# Patient Record
Sex: FEMALE | Race: WHITE | Employment: FULL TIME | URBAN - METROPOLITAN AREA
[De-identification: names, ages, dates, MRNs, and addresses within clinical notes are randomized per-mention and may not be internally consistent; named-entity substitution may affect disease eponyms.]

---

## 2020-01-07 ENCOUNTER — HOSPITAL ENCOUNTER (OUTPATIENT)
Dept: PHYSICAL THERAPY | Age: 45
Discharge: HOME | End: 2020-01-07
Payer: COMMERCIAL

## 2020-01-07 PROCEDURE — 97161 PT EVAL LOW COMPLEX 20 MIN: CPT

## 2020-01-07 PROCEDURE — 97140 MANUAL THERAPY 1/> REGIONS: CPT

## 2020-01-07 NOTE — PROGRESS NOTES
PHYSICAL THERAPY - DAILY TREATMENT NOTE    Patient Name: Margarita Burt        Date: 2020  : 1975   YES Patient  Verified  Visit #:      12  Insurance: Payor: Jan Adhikari / Plan: Blaise Padilla HMO / Product Type: HMO /      In time: 6:00 Out time: 6:40   Total Treatment Time: 40     Medicare Time Tracking (below)   Total Timed Codes (min):  na 1:1 Treatment Time:  na     TREATMENT AREA =  Low back pain [M54.5]    SUBJECTIVE  Pain Level (on 0 to 10 scale):    Medication Changes/New allergies or changes in medical history, any new surgeries or procedures? 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, R IS post inn, L4 L FRS, L on L SI lyn   Rationale:      decrease pain, increase ROM and increase tissue extensibility to improve patient's ability to perform ADLs    5 min Self Care: bridges   Rationale:    increase ROM, increase strength and improve coordination to improve the patients ability to perform ADLs      Billed With/As:   [] TE   [] TA   [] Neuro   [x] Self Care Patient Education: [x] Review HEP    [] Progressed/Changed HEP based on:   [] positioning   [] body mechanics   [] transfers   [] heat/ice application    [] other:       min Patient Education:  YES  Reviewed HEP   []  Progressed/Changed HEP based on:         Other Objective/Functional Measures:    SEE IE     Post Treatment Pain Level (on 0 to 10) scale:       ASSESSMENT  Assessment/Changes in Function:     SEE IE     []  See Progress Note/Recertification   Patient will continue to benefit from skilled PT services to modify and progress therapeutic interventions, address functional mobility deficits, address ROM deficits, address strength deficits, analyze and address soft tissue restrictions, analyze and cue movement patterns, analyze and modify body mechanics/ergonomics and assess and modify postural abnormalities to attain remaining goals.    Progress toward goals / Updated goals:         PLAN  []  Upgrade activities as tolerated YES Continue plan of care   []  Discharge due to :    []  Other:      Therapist: Yuval Cisneros, PT, OCS, SCS, CSCS    Date: 1/7/2020 Time: 6:59 PM       Future Appointments   Date Time Provider Dangelo Bhardwaj   1/23/2020  6:30 AM Jun Gallardo, PT [facility]   1/28/2020  5:00 PM Jun Gallardo, PT [facility]   1/30/2020  8:00 AM Jun Gallardo, PT [facility]   2/3/2020  8:00 AM Jun Gallardo, PT [facility]   2/6/2020  8:00 AM Jun Gallardo, PT [facility]   2/10/2020  8:00 AM Jun Gallardo, PT [facility]   2/13/2020  8:00 AM Jun Gallardo, PT [facility]

## 2020-01-08 NOTE — PROGRESS NOTES
[address] [place] [address], [address] - Phone: [phone]  Fax: [phone] / [phone]  Patient Name: Karen Rojas : 1975   Medical   Diagnosis: Low back pain [M54.5] Treatment Diagnosis: Low back pain [M54.5]   Onset Date: ~2month ago     Referral Source: Jahaira Rodgers MD Start of Care [facility]): 2020   Prior Hospitalization: See medical history Provider #: [id number]   Prior Level of Function: Pain free ADLs   Comorbidities: none   Medications: Verified on Patient Summary List   The Plan of Care and following information is based on the information from the initial evaluation.   ===========================================================================================  Assessment / key information:  Karen Rojas is a 40 y.o.  yo female with Dx of Low back pain [M54.5]. She reports insidious onset of LBP ~2month ago. She currently rates her pain as 6/10 at worst, 0/10 at best, primarily located at lower lumbar region as well as lateral aspect of her B hips (R>L). She complains of difficulty and increase pain with prolonged sitting. Objective Findings:  Lumbar ROM: Flx  = WNL, Ext = limited by 20%, Rot: R = limited by 20%, L = limited by 20%. Manual Muscle Testing:  R hip abd and R ankle IV are REduced. SI/Lumbar Screening: R iliosacral post innominate, L4 LFRS, L on L SI innominate noted.   Pt instructed in HEP and will f/u in clinic for PT.  ===========================================================================================  Eval Complexity: History LOW Complexity : Zero comorbidities / personal factors that will impact the outcome / POC;  Examination  MEDIUM Complexity : 3 Standardized tests and measures addressing body structure, function, activity limitation and / or participation in recreation ; Presentation MEDIUM Complexity : Evolving with changing characteristics ; Decision Making MEDIUM Complexity : FOTO score of 26-74; Overall Complexity MEDIUM  Problem List: pain affecting function, decrease ROM, decrease strength, decrease ADL/ functional abilitiies, decrease activity tolerance and decrease flexibility/ joint mobility   Treatment Plan may include any combination of the following: Therapeutic exercise, Therapeutic activities, Neuromuscular re-education, Manual therapy, Patient education, Self Care training and Functional mobility training  Patient / Family readiness to learn indicated by: asking questions  Persons(s) to be included in education: patient (P)  Barriers to Learning/Limitations: None  Measures taken, if barriers to learning:    Patient Goal (s): Decrease pain    Patient self reported health status: good  Rehabilitation Potential: good     Short Term Goals: To be accomplished in  1-2  weeks:  1. Independent with HEP. 2. Decrease max pain 25-50% to assist with ADLs   Long Term Goals: To be accomplished in  3-4  weeks:  1. Decrease max pain 50-75% to assist with ADLs  2. Increase FOTO score to 75% to show functional improvment. 3.  Will rate  >/= +5 on Global Rating of Change and be prepared to DC to HEP. Frequency / Duration:   Patient to be seen  2-3  times per week for 3-4  weeks:  Patient / Caregiver education and instruction: self care and exercises    Therapist Signature: Aziza Brooks, DPT, OCS, SCS, CSCS Date: 9/9/6545   Certification Period: na Time: 7:00 PM   ===========================================================================================  I certify that the above Physical Therapy Services are being furnished while the patient is under my care. I agree with the treatment plan and certify that this therapy is necessary. Physician Signature:        Date:       Time:     Please sign and return to [facility] or you may fax the signed copy to [phone]. Thank you.

## 2020-01-23 ENCOUNTER — HOSPITAL ENCOUNTER (OUTPATIENT)
Dept: PHYSICAL THERAPY | Age: 45
Discharge: HOME | End: 2020-01-23
Payer: COMMERCIAL

## 2020-01-23 PROCEDURE — 97110 THERAPEUTIC EXERCISES: CPT

## 2020-01-23 PROCEDURE — 97140 MANUAL THERAPY 1/> REGIONS: CPT

## 2020-01-23 NOTE — PROGRESS NOTES
PHYSICAL THERAPY - DAILY TREATMENT NOTE    Patient Name: Nelly Macias        Date: 2020  : 1975   yes Patient  Verified  Visit #:   2   of   12  Insurance: Payor: Noa Xiao / Plan: [employer] Aprilrudi HMO / Product Type: HMO /      In time: 6:30 Out time: 7:16   Total Treatment Time: 46     Medicare/[facility] Time Tracking (below)   Total Timed Codes (min):  na 1:1 Treatment Time:  na     TREATMENT AREA =  Low back pain [M54.5]  SUBJECTIVE  Pain Level (on 0 to 10 scale):  1  / 10   Medication Changes/New allergies or changes in medical history, any new surgeries or procedures?    no  If yes, update Summary List   Subjective Functional Status/Changes:  []  No changes reported     Still painful when I sit for a prolonged period of time. 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 Indication(s): [] Muscle spasms [] Myalgia/Myositis  [] Muscle cramps      [] Muscle imbalances [] TMD (TMJ) [] Myofascial pain & dysfunction     [] Other: __    Chart reviewed for the following:  Basia HUITRON PT, have reviewed the medical history, summary list and precautions/contraindications for Nelly Macias.     TIME OUT performed immediately prior to start of procedure:  6:33 (enter time the timeout was completed)  Basia HUITRON PT, have performed the following reviews on Alba Schwartz prior to the start of the session:      [x] Patient was identified by name and date of birth    [x] Agreement on all muscles being treated was verified   [x] Purpose of dry needling, side effects, possible complications, risks and benefits were explained to the patient   [x] Procedure site(s) verified  [x] Patient was positioned for comfort and draped for privacy  [x] Informed Consent was signed (initial visit) and verified verbally (subsequent visits)  [x] Patient was instructed on the need to report the use of blood thinners and/or immunosuppressant medications  [x] How to respond to possible adverse effects of treatment  [x] Self treatment of post needling soreness: ice, heat (moist heat, heat wraps) and stretching  [x] Opportunity was given to ask any questions, all questions were answered            Treatment:  The following muscles were treated today:    Right: Piri, Gm, HS   Left:      Patients response to todays treatment:   []  LTRs  []  Muscle Relaxation  []  Pain Relief  []  Decreased Tinnitus  []  Decreased HAs []  Post needling soreness []  Increased ROM   []  Other:      Actual needle insertion time is not billed     Billed With/As:   [] TE   [] TA   [] Neuro   [] Self Care Patient Education: [x] Review HEP    [] Progressed/Changed HEP based on:   [] positioning   [] body mechanics   [] transfers   [] heat/ice application    [] other:      Other Objective/Functional Measures:    Sig increase in soft tissue tension noted at R Gm     Post Treatment Pain Level (on 0 to 10) scale:   1-2  / 10     ASSESSMENT  Assessment/Changes in Function:     C/o soreness at R hip after Dn     []  See Progress Note/Recertification   Patient will continue to benefit from skilled PT services to modify and progress therapeutic interventions, address functional mobility deficits and address ROM deficits to attain remaining goals. Progress toward goals / Updated goals:     Independent with HEP PLAN  [x]  Upgrade activities as tolerated yes Continue plan of care   []  Discharge due to :    []  Other:      Therapist: Jacob Alexander, PT    Date: 1/23/2020 Time: 6:26 AM     Future Appointments   Date Time Provider Dangelo Bhardwaj   1/23/2020  6:30 AM Zachery Getting, PT [facility]   1/28/2020  5:00 PM [place] Getting, PT [facility]   1/30/2020  8:00 AM [place] Getting, PT [facility]   2/3/2020  8:00 AM [place] Getting, PT [facility]   2/6/2020  8:00 AM [place] Getting, PT [facility]   2/10/2020  8:00 AM [place] Getting, PT [facility]   2/13/2020  8:00 AM Zachery Getting, PT [facility]

## 2020-01-28 ENCOUNTER — HOSPITAL ENCOUNTER (OUTPATIENT)
Dept: PHYSICAL THERAPY | Age: 45
Discharge: HOME | End: 2020-01-28
Payer: COMMERCIAL

## 2020-01-28 PROCEDURE — 97110 THERAPEUTIC EXERCISES: CPT

## 2020-01-28 PROCEDURE — 97140 MANUAL THERAPY 1/> REGIONS: CPT

## 2020-01-28 NOTE — PROGRESS NOTES
PHYSICAL THERAPY - DAILY TREATMENT NOTE    Patient Name: Judi Phalen        Date: 2020  : 1975   yes Patient  Verified  Visit #:   3   of   12  Insurance: Payor: Awa Lockhart / Plan: Katei Meyers HMO / Product Type: HMO /      In time: 5:10 Out time: 6:00   Total Treatment Time: 50     Medicare/[employer]BS Time Tracking (below)   Total Timed Codes (min):  na 1:1 Treatment Time:  na     TREATMENT AREA =  Low back pain [M54.5]  SUBJECTIVE  Pain Level (on 0 to 10 scale):  0  / 10   Medication Changes/New allergies or changes in medical history, any new surgeries or procedures?    no  If yes, update Summary List   Subjective Functional Status/Changes:  []  No changes reported     I had a pretty intense workout yesterday, and I did ok. So Im getting better          OBJECTIVE    30 min Therapeutic Exercise:  [x]? 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, L IS post inn, DTM R Gm, R RF   Rationale:      decrease pain, increase ROM and increase tissue extensibility to improve patient's ability to perform ADLs       Billed With/As:   [] TE   [] TA   [] Neuro   [] Self Care Patient Education: [x] Review HEP    [] Progressed/Changed HEP based on:   [] positioning   [] body mechanics   [] transfers   [] heat/ice application    [] other:      Other Objective/Functional Measures:    Increased soft tissue tension noted R RF     Post Treatment Pain Level (on 0 to 10) scale:   0  / 10     ASSESSMENT  Assessment/Changes in Function:     Good stability with 1/2 kneeling     []  See Progress Note/Recertification   Patient will continue to benefit from skilled PT services to modify and progress therapeutic interventions, address functional mobility deficits, address ROM deficits and address strength deficits to attain remaining goals.    Progress toward goals / Updated goals:    Progressing well with pain reduction      PLAN  [x]  Upgrade activities as tolerated yes Continue plan of care   []  Discharge due to :    []  Other:      Therapist: Annabel Daniels PT    Date: 1/28/2020 Time: 2:13 PM     Future Appointments   Date Time Provider Dangelo Bhardwaj   1/28/2020  5:00 PM Taisha Ellis, PT [facility]   1/30/2020  8:00 AM Taisha Ellis, PT [facility]   2/3/2020  8:00 AM Taisha Ellis, PT [facility]   2/6/2020  8:00 AM Taisha Ellis, PT [facility]   2/10/2020  8:00 AM Taisha Ellis, PT [facility]   2/13/2020  8:00 AM Taisha Ellis, PT [facility]

## 2020-01-30 ENCOUNTER — HOSPITAL ENCOUNTER (OUTPATIENT)
Dept: PHYSICAL THERAPY | Age: 45
Discharge: HOME | End: 2020-01-30
Payer: COMMERCIAL

## 2020-01-30 PROCEDURE — 97140 MANUAL THERAPY 1/> REGIONS: CPT

## 2020-01-30 PROCEDURE — 97110 THERAPEUTIC EXERCISES: CPT

## 2020-01-30 NOTE — PROGRESS NOTES
PHYSICAL THERAPY - DAILY TREATMENT NOTE    Patient Name: Dameon Morton        Date: 2020  : 1975   yes Patient  Verified  Visit #:   4   of   12  Insurance: Payor: Candis Andrew / Plan: Fabrizio Anguiano HMO / Product Type: HMO /      In time: 8:05 Out time: 8:47   Total Treatment Time: 42     Medicare/[facility] Time Tracking (below)   Total Timed Codes (min):  na 1:1 Treatment Time:  na     TREATMENT AREA =  Low back pain [M54.5]  SUBJECTIVE  Pain Level (on 0 to 10 scale):  3.5  / 10   Medication Changes/New allergies or changes in medical history, any new surgeries or procedures?    no  If yes, update Summary List   Subjective Functional Status/Changes:  []  No changes reported     I played volleyball so im hurting          OBJECTIVE  30 min Therapeutic Exercise:  [x]? ?  See flow sheet   Rationale:      increase ROM, increase strength and improve coordination to improve the patients ability to perform ADLs      12 min Manual Therapy: Shot gun tech, DTM R Add, para, L3 L ERS, THERESA L Sternal hift   Rationale:      decrease pain, increase ROM and increase tissue extensibility to improve patient's ability to perform ADLs         Billed With/As:   [] TE   [] TA   [] Neuro   [] Self Care Patient Education: [x] Review HEP    [] Progressed/Changed HEP based on:   [] positioning   [] body mechanics   [] transfers   [] heat/ice application    [] other:      Other Objective/Functional Measures:    + ADT on L      Post Treatment Pain Level (on 0 to 10) scale:   2  / 10     ASSESSMENT  Assessment/Changes in Function:     Difficulty maintaining neutral spine with hip hinge     []  See Progress Note/Recertification   Patient will continue to benefit from skilled PT services to modify and progress therapeutic interventions, address functional mobility deficits and address ROM deficits to attain remaining goals.    Progress toward goals / Updated goals:    Slow progress with pain reduction      PLAN  [x]  Upgrade activities as tolerated yes Continue plan of care   []  Discharge due to :    []  Other:      Therapist: Luis Fernando Alicea PT    Date: 1/30/2020 Time: 6:29 AM     Future Appointments   Date Time Provider Dangelo Bhardwaj   1/30/2020  8:00 AM Pecola Bidding, PT [facility]   2/3/2020  8:00 AM Pecola Bidding, PT [facility]   2/6/2020  8:00 AM Pecola Bidding, PT [facility]   2/10/2020  8:00 AM Pecola Bidding, PT [facility]   2/13/2020  8:00 AM Pecola Bidding, PT [facility]

## 2020-02-03 ENCOUNTER — HOSPITAL ENCOUNTER (OUTPATIENT)
Dept: PHYSICAL THERAPY | Age: 45
Discharge: HOME | End: 2020-02-03
Payer: COMMERCIAL

## 2020-02-03 PROCEDURE — 97110 THERAPEUTIC EXERCISES: CPT

## 2020-02-03 PROCEDURE — 97140 MANUAL THERAPY 1/> REGIONS: CPT

## 2020-02-03 NOTE — PROGRESS NOTES
PHYSICAL THERAPY - DAILY TREATMENT NOTE    Patient Name: Margarita Burt        Date: 2/3/2020  : 1975   yes Patient  Verified  Visit #:      of   12  Insurance: Payor: Jan Adhikari / Plan: Blaise Padilla HMO / Product Type: HMO /      In time: 8:10 Out time: 9:00   Total Treatment Time: 50     Medicare/[employer] Time Tracking (below)   Total Timed Codes (min):  na 1:1 Treatment Time:  na     TREATMENT AREA =  Low back pain [M54.5]  SUBJECTIVE  Pain Level (on 0 to 10 scale):  4  / 10   Medication Changes/New allergies or changes in medical history, any new surgeries or procedures?    no  If yes, update Summary List   Subjective Functional Status/Changes:  []  No changes reported     I was good for a few days, but I didn't do much. I worked out this am and im hurting a little more          OBJECTIVE  30 min Therapeutic Exercise:  [x]? ??  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, DTM R Add, para, L3-5L ERS, THERESA L Sternal hift   Rationale:      decrease pain, increase ROM and increase tissue extensibility to improve patient's ability to perform ADLs         Billed With/As:   [] TE   [] TA   [] Neuro   [] Self Care Patient Education: [x] Review HEP    [] Progressed/Changed HEP based on:   [] positioning   [] body mechanics   [] transfers   [] heat/ice application    [] other:      Other Objective/Functional Measures:  Cont to present with L IS ant innominate       Post Treatment Pain Level (on 0 to 10) scale:    10     ASSESSMENT  Assessment/Changes in Function:     Decreased pain with amb after man Rx     []  See Progress Note/Recertification   Patient will continue to benefit from skilled PT services to modify and progress therapeutic interventions, address functional mobility deficits and address ROM deficits to attain remaining goals.    Progress toward goals / Updated goals:     Slow progress with pain reduction      PLAN  [x]  Upgrade activities as tolerated yes Continue plan of care   []  Discharge due to :    []  Other:      Therapist: Santy Hampton PT    Date: 2/3/2020 Time: 6:28 AM     Future Appointments   Date Time Provider Dangelo Bhardwaj   2/3/2020  8:00 AM Satish Alvarez, PT [facility]   2/6/2020  8:00 AM Satish Alvarez, PT [facility]   2/10/2020  8:00 AM Satish Alvarez PT [facility]   2/13/2020  8:00 AM Satish Alvarez, PT [facility]

## 2020-02-06 ENCOUNTER — HOSPITAL ENCOUNTER (OUTPATIENT)
Dept: PHYSICAL THERAPY | Age: 45
Discharge: HOME | End: 2020-02-06
Payer: COMMERCIAL

## 2020-02-06 PROCEDURE — 97110 THERAPEUTIC EXERCISES: CPT

## 2020-02-06 PROCEDURE — 97140 MANUAL THERAPY 1/> REGIONS: CPT

## 2020-02-06 NOTE — PROGRESS NOTES
[address] [place] [address], [address] [place] [address] - Phone: [phone]  Fax: [phone]  PROGRESS NOTE  Patient Name: Jamia Mcclelland : 1975   Treatment/Medical Diagnosis: Low back pain [M54.5]   Referral Source: Gaurav Gil MD     Date of Initial Visit: 20 Attended Visits: 6 Missed Visits: 0     SUMMARY OF TREATMENT  Jamia Mcclelland has been seen at our clinic 1-2x/wk for a total of 6 visits. Pt treatment has consisted of  therapeutic exercise for thoracic ROM, hip/core strengthening, and manual therapy(jt mobilization and deep tissue mobilization)  CURRENT STATUS  Pt has had a good tolerance to physical therapy treatment. She reports some improvement with her pain level. She was advised not to participate in high intensity exercise class. Since then she has on reported minimal pain. She continues to present with L iliosacral ant innominate and excessive WB on R. Goal/Measure of Progress Goal Met? 1. Decrease Max pain by 50-75% to assist with ADLs   Status at last Eval: 6/10 Current Status: 4/10 progressing   2. Increase FOTO score to 675 % show functional improvement   Status at last Eval: 61% Current Status: 57% no   3. Will rate >/= +5 on Global Rating of Change and be prepared to DC to HEP. Status at last Eval: na Current Status: +2 progressing     New Goals to be achieved in __4__  weeks:  1. Cont with all goals above   2.     3.     RECOMMENDATIONS    Specifics: 2-3x/wk x 4 more wks  If you have any questions/comments please contact us directly at [phone]. Thank you for allowing us to assist in the care of your patient.     Therapist Signature: Barbara Enriquez, DPT, OCS, SCS, CSCS Date: 2020     Time: 1:50 PM   NOTE TO PHYSICIAN:  PLEASE COMPLETE THE ORDERS BELOW AND FAX TO   [facility]: [phone]  If you are unable to process this request in 24 hours please contact our office: [phone]    ___ I have read the above report and request that my patient continue as recommended.   ___ I have read the above report and request that my patient continue therapy with the following changes/special instructions:_________________________________________________________   ___ I have read the above report and request that my patient be discharged from therapy.      Physician Signature:        Date:       Time:

## 2020-02-06 NOTE — PROGRESS NOTES
PHYSICAL THERAPY - DAILY TREATMENT NOTE    Patient Name: Jamia Mcclelland        Date: 2020  : 1975   yes Patient  Verified  Visit #:     Insurance: Payor: Joe Cruz / Plan: [address] [place] HMO / Product Type: HMO /      In time: 8:03 Out time: 8:55   Total Treatment Time: 52     Medicare/[facility] Time Tracking (below)   Total Timed Codes (min):  na 1:1 Treatment Time: na     TREATMENT AREA =  Low back pain [M54.5]  SUBJECTIVE  Pain Level (on 0 to 10 scale):  0/ 10   Medication Changes/New allergies or changes in medical history, any new surgeries or procedures?    no  If yes, update Summary List   Subjective Functional Status/Changes:  []  No changes reported     im not hurting today, but I havent done much. /10 at the worst with intensive exercise     OBJECTIVE  35 min Therapeutic Exercise:  [x]? ???  See flow sheet   Rationale:      increase ROM, increase strength and improve coordination to improve the patients ability to perform ADLs      17 min Manual Therapy: Shot gun tech, DTM R Add, para, L3-5L ERS, THERESA L Sternal hift   Rationale:      decrease pain, increase ROM and increase tissue extensibility to improve patient's ability to perform ADLs      Billed With/As:   [] TE   [] TA   [] Neuro   [] Self Care Patient Education: [x] Review HEP    [] Progressed/Changed HEP based on:   [] positioning   [] body mechanics   [] transfers   [] heat/ice application    [] other:      Other Objective/Functional Measures:    Cont to have + L ADT     Post Treatment Pain Level (on 0 to 10) scale:   0  / 10     ASSESSMENT  Assessment/Changes in Function:   FOTO = 57  GROC = +2   []  See Progress Note/Recertification   Patient will continue to benefit from skilled PT services to modify and progress therapeutic interventions, address functional mobility deficits and address ROM deficits to attain remaining goals.    Progress toward goals / Updated goals:    Slow progress with function      PLAN  [x]  Upgrade activities as tolerated yes Continue plan of care   []  Discharge due to :    []  Other:      Therapist: Tonie Loya PT    Date: 2/6/2020 Time: 6:27 AM     Future Appointments   Date Time Provider Dangelo Bhardwaj   2/6/2020  8:00 AM Rusty Cowart, PT [facility]   2/10/2020  8:00 AM Rusty Cowart, PT [facility]   2/13/2020  8:00 AM Rusty Cowart, PT [id number] [facility]

## 2020-02-10 ENCOUNTER — HOSPITAL ENCOUNTER (OUTPATIENT)
Dept: PHYSICAL THERAPY | Age: 45
Discharge: HOME | End: 2020-02-10
Payer: COMMERCIAL

## 2020-02-10 PROCEDURE — 97110 THERAPEUTIC EXERCISES: CPT

## 2020-02-10 PROCEDURE — 97140 MANUAL THERAPY 1/> REGIONS: CPT

## 2020-02-10 NOTE — PROGRESS NOTES
PHYSICAL THERAPY - DAILY TREATMENT NOTE    Patient Name: Dameon Morton        Date: 2/10/2020  : 1975   yes Patient  Verified  Visit #:     Insurance: Payor: Candis Andrew / Plan: [employer] [place] [employer] / Product Type: HMO /      In time: 8:10 Out time: 8:55   Total Treatment Time: 45     Medicare/[facility] Time Tracking (below)   Total Timed Codes (min):  na 1:1 Treatment Time:  na     TREATMENT AREA =  Low back pain [M54.5]  SUBJECTIVE  Pain Level (on 0 to 10 scale):  2  / 10   Medication Changes/New allergies or changes in medical history, any new surgeries or procedures?    no  If yes, update Summary List   Subjective Functional Status/Changes:  []  No changes reported     I was hurting yesterday. Maybe because I drove up and back from [place]          OBJECTIVE  30 min Therapeutic Exercise:  [x]? ????  See flow sheet   Rationale:      increase ROM, increase strength and improve coordination to improve the patients ability to perform ADLs      15 min Manual Therapy: Shot gun tech, DTM R Add, para, L3-5L ERS, THERESA L Sternal hift   Rationale:      decrease pain, increase ROM and increase tissue extensibility to improve patient's ability to perform ADLs      Billed With/As:   [] TE   [] TA   [] Neuro   [] Self Care Patient Education: [x] Review HEP    [] Progressed/Changed HEP based on:   [] positioning   [] body mechanics   [] transfers   [] heat/ice application    [] other:      Other Objective/Functional Measures:    Cont to present with+  L ADT on L      Post Treatment Pain Level (on 0 to 10) scale:   2  / 10     ASSESSMENT  Assessment/Changes in Function:     Decreased pain with amb after man Rx     []  See Progress Note/Recertification   Patient will continue to benefit from skilled PT services to modify and progress therapeutic interventions, address functional mobility deficits and address ROM deficits to attain remaining goals.    Progress toward goals / Updated goals:    Slow progress with pain reduction      PLAN  [x]  Upgrade activities as tolerated yes Continue plan of care   []  Discharge due to :    []  Other:      Therapist: Eze Dhillon PT    Date: 2/10/2020 Time: 6:31 AM     Future Appointments   Date Time Provider Dangelo Bhardwaj   2/10/2020  8:00 AM Anisa Campos, PT [facility]   2/13/2020  8:00 AM Anisa Campos, PT [facility]

## 2020-02-12 ENCOUNTER — HOSPITAL ENCOUNTER (OUTPATIENT)
Dept: PHYSICAL THERAPY | Age: 45
Discharge: HOME | End: 2020-02-12
Payer: COMMERCIAL

## 2020-02-12 PROCEDURE — 97110 THERAPEUTIC EXERCISES: CPT

## 2020-02-12 PROCEDURE — 97140 MANUAL THERAPY 1/> REGIONS: CPT

## 2020-02-12 NOTE — PROGRESS NOTES
PHYSICAL THERAPY - DAILY TREATMENT NOTE    Patient Name: Juan Stone        Date: 2020  : 1975   yes Patient  Verified  Visit #:     Insurance: Payor: Felicita Grady / Plan: [employer] HMO / Product Type: HMO /      In time: 7:00 Out time: 7:45   Total Treatment Time: 45     Medicare/[employer] Time Tracking (below)   Total Timed Codes (min):  na 1:1 Treatment Time:  na     TREATMENT AREA =  Low back pain [M54.5]  SUBJECTIVE  Pain Level (on 0 to 10 scale):  2  / 10   Medication Changes/New allergies or changes in medical history, any new surgeries or procedures?    no  If yes, update Summary List   Subjective Functional Status/Changes:  []  No changes reported     Just a little sore at the R hip. I worked out this am a little and it was ok          OBJECTIVE  30 min Therapeutic Exercise:  [x]??????  See flow sheet   Rationale:      increase ROM, increase strength and improve coordination to improve the patients ability to perform ADLs      15 min Manual Therapy: Shot gun tech, DTM R piri, L5 L FRS lyn   Rationale:      decrease pain, increase ROM and increase tissue extensibility to improve patient's ability to perform ADLs      Billed With/As:   [] TE   [] TA   [] Neuro   [] Self Care Patient Education: [x] Review HEP    [] Progressed/Changed HEP based on:   [] positioning   [] body mechanics   [] transfers   [] heat/ice application    [] other:      Other Objective/Functional Measures:    Able to self correct pelvic innominate with hip shift     Post Treatment Pain Level (on 0 to 10) scale:   1  / 10     ASSESSMENT  Assessment/Changes in Function:     kristin plyometrics without increase in pain      []  See Progress Note/Recertification   Patient will continue to benefit from skilled PT services to modify and progress therapeutic interventions, address functional mobility deficits and address ROM deficits to attain remaining goals.    Progress toward goals / Updated goals:    Slowly progressing with pain reduction      PLAN  [x]  Upgrade activities as tolerated yes Continue plan of care   []  Discharge due to :    []  Other:      Therapist: Peyton Hernandez PT    Date: 2/12/2020 Time: 6:35 AM     Future Appointments   Date Time Provider Dangelo Bhardwaj   2/12/2020  7:00 AM Abner Liriano, PT [facility]   2/20/2020  8:00 AM Abner Liriano, PT [facility]   2/25/2020  5:30 PM Abner Liriano, PT [facility]   2/27/2020  7:30 AM Abner Liriano, PT [facility]

## 2020-02-13 ENCOUNTER — APPOINTMENT (OUTPATIENT)
Dept: PHYSICAL THERAPY | Age: 45
End: 2020-02-13
Payer: COMMERCIAL

## 2020-02-18 ENCOUNTER — HOSPITAL ENCOUNTER (OUTPATIENT)
Dept: PHYSICAL THERAPY | Age: 45
Discharge: HOME | End: 2020-02-18
Payer: COMMERCIAL

## 2020-02-18 PROCEDURE — 97140 MANUAL THERAPY 1/> REGIONS: CPT

## 2020-02-18 NOTE — PROGRESS NOTES
PHYSICAL THERAPY - DAILY TREATMENT NOTE    Patient Name: Jamia Mcclelland        Date: 2020  : 1975   yes Patient  Verified  Visit #:     Insurance: Payor: Joe Cruz / Plan: [address] [place] HMO / Product Type: HMO /      In time: 12:30 Out time: 1:00   Total Treatment Time: 30     Medicare/[facility] Time Tracking (below)   Total Timed Codes (min):  na 1:1 Treatment Time:  na     TREATMENT AREA =  Low back pain [M54.5]  SUBJECTIVE  Pain Level (on 0 to 10 scale):  5  / 10   Medication Changes/New allergies or changes in medical history, any new surgeries or procedures?    no  If yes, update Summary List   Subjective Functional Status/Changes:  []  No changes reported     im hurting a lot. I traveled, slept on uncomfortable bed, and lifted bunch of boxes so im hurting. OBJECTIVE       30 min Manual Therapy: Shot gun tech, DTM R piri, L5 L ERS lyn, L on L SI lyn   Rationale:      decrease pain, increase ROM and increase tissue extensibility to improve patient's ability to perform ADLs      Billed With/As:   [] TE   [] TA   [] Neuro   [] Self Care Patient Education: [x] Review HEP    [] Progressed/Changed HEP based on:   [] positioning   [] body mechanics   [] transfers   [] heat/ice application    [] other:      Other Objective/Functional Measures:    Cont to have sig increase in soft tissue tension noted at [employer]	[facility] Pain Level (on 0 to 10) scale:   2.5  / 10     ASSESSMENT  Assessment/Changes in Function:     Decreased pain with amb after man Rx     []  See Progress Note/Recertification   Patient will continue to benefit from skilled PT services to modify and progress therapeutic interventions, address functional mobility deficits and address ROM deficits to attain remaining goals.    Progress toward goals / Updated goals:    No sig change towards LTGs today     PLAN  [x]  Upgrade activities as tolerated yes Continue plan of care   []  Discharge due to :    []  Other: Therapist: Esther Gonzalez, PT    Date: 2/18/2020 Time: 9:49 AM     Future Appointments   Date Time Provider Dangelo Lashae   2/18/2020 12:30 PM Arlet Mayo, PT [facility]   2/20/2020  8:00 AM Arlet Mayo, PT [facility]   2/25/2020  5:30 PM Arlet Mayo, PT [facility]   2/27/2020  7:30 AM Arlet Mayo, PT [facility]

## 2020-02-20 ENCOUNTER — HOSPITAL ENCOUNTER (OUTPATIENT)
Dept: PHYSICAL THERAPY | Age: 45
Discharge: HOME | End: 2020-02-20
Payer: COMMERCIAL

## 2020-02-20 PROCEDURE — 97110 THERAPEUTIC EXERCISES: CPT

## 2020-02-20 PROCEDURE — 97140 MANUAL THERAPY 1/> REGIONS: CPT

## 2020-02-20 NOTE — PROGRESS NOTES
PHYSICAL THERAPY - DAILY TREATMENT NOTE    Patient Name: Xiang Mendoza        Date: 2020  : 1975   yes Patient  Verified  Visit #:   10   of   12  Insurance: Payor: Cristina Guerrero / Plan: [employer] HMO / Product Type: HMO /      In time: 8:00 Out time: 8:50   Total Treatment Time: 50     Medicare/[facility] Time Tracking (below)   Total Timed Codes (min):  na 1:1 Treatment Time:  na     TREATMENT AREA =  Low back pain [M54.5]  SUBJECTIVE  Pain Level (on 0 to 10 scale):  4  / 10   Medication Changes/New allergies or changes in medical history, any new surgeries or procedures?    no  If yes, update Summary List   Subjective Functional Status/Changes:  []  No changes reported     Its a little better than last time, but it hasnt been right since I played volley bal and traveled last week.   Had some numbness in my heel          OBJECTIVE      20 min Therapeutic Exercise:  [x]  See flow sheet   Rationale:      increase ROM, increase strength and improve coordination to improve the patients ability to perform ADLs     30 min Manual Therapy: Shot gun tech, DTM R piri, adductor L5 L ERS lyn, L on L SI yln   Rationale:      decrease pain, increase ROM and increase tissue extensibility to improve patient's ability to perform ADLs      Billed With/As:   [] TE   [] TA   [] Neuro   [] Self Care Patient Education: [x] Review HEP    [] Progressed/Changed HEP based on:   [] positioning   [] body mechanics   [] transfers   [] heat/ice application    [] other:      Other Objective/Functional Measures:    Increased soft tissue tension noted at R adductor and piri     Post Treatment Pain Level (on 0 to 10) scale:   2  / 10     ASSESSMENT  Assessment/Changes in Function:     No increase in numbness at R LE with repeated flx in standing     []  See Progress Note/Recertification   Patient will continue to benefit from skilled PT services to modify and progress therapeutic interventions, address functional mobility deficits and address ROM deficits to attain remaining goals.    Progress toward goals / Updated goals:    Slow progress with pain reduction      PLAN  [x]  Upgrade activities as tolerated yes Continue plan of care   []  Discharge due to :    []  Other:      Therapist: Nato Wick PT    Date: 2/20/2020 Time: 6:30 AM     Future Appointments   Date Time Provider Dangelo Bhardwaj   2/20/2020  8:00 AM July You, PT [facility]   2/25/2020  5:30 PM July You, PT [facility]   2/27/2020  7:30 AM July You, PT [facility]

## 2020-02-25 ENCOUNTER — HOSPITAL ENCOUNTER (OUTPATIENT)
Dept: PHYSICAL THERAPY | Age: 45
Discharge: HOME | End: 2020-02-25
Payer: COMMERCIAL

## 2020-02-25 PROCEDURE — 97140 MANUAL THERAPY 1/> REGIONS: CPT

## 2020-02-25 NOTE — PROGRESS NOTES
PHYSICAL THERAPY - DAILY TREATMENT NOTE    Patient Name: Angela Ceron        Date: 2020  : 1975   yes Patient  Verified  Visit #:     Insurance: Payor: Whitney Corey / Plan: [employer] [employer] / Product Type: HMO /      In time: 5:40 Out time: 6:05   Total Treatment Time: 25     Medicare/[facility] Time Tracking (below)   Total Timed Codes (min):  na 1:1 Treatment Time:  na     TREATMENT AREA =  Low back pain [M54.5]  SUBJECTIVE  Pain Level (on 0 to 10 scale):  3   10   Medication Changes/New allergies or changes in medical history, any new surgeries or procedures?    no  If yes, update Summary List   Subjective Functional Status/Changes:  []  No changes reported     I have been doing the ex and it think its helping          OBJECTIVE    25 min Manual Therapy: Shot gun tech, DTM R piri, adductor L5 L ERS lyn,    Rationale:      decrease pain, increase ROM and increase tissue extensibility to improve patient's ability to perform ADLs      Billed With/As:   [] TE   [] TA   [] Neuro   [] Self Care Patient Education: [x] Review HEP    [] Progressed/Changed HEP based on:   [] positioning   [] body mechanics   [] transfers   [] heat/ice application    [] other:      Other Objective/Functional Measures:    Cont to have increased soft tissue tension noted at L/S para today     Post Treatment Pain Level (on 0 to 10) scale:   1  / 10     ASSESSMENT  Assessment/Changes in Function:     No pain with sit to stand transfer after man Rx     []  See Progress Note/Recertification   Patient will continue to benefit from skilled PT services to modify and progress therapeutic interventions, address functional mobility deficits, address ROM deficits and address strength deficits to attain remaining goals.    Progress toward goals / Updated goals:    Progressing slowly with pain reduction      PLAN  [x]  Upgrade activities as tolerated yes Continue plan of care   []  Discharge due to :    []  Other:      Therapist: Josué Cuevas, PT    Date: 2/25/2020 Time: 9:57 AM     Future Appointments   Date Time Provider Dangelo Bhardwaj   2/25/2020  5:30 PM Donovan Ro, PT [facility]   2/27/2020  7:30 AM Donovan Ro, PT [facility]

## 2020-02-27 ENCOUNTER — HOSPITAL ENCOUNTER (OUTPATIENT)
Dept: PHYSICAL THERAPY | Age: 45
Discharge: HOME | End: 2020-02-27
Payer: COMMERCIAL

## 2020-02-27 PROCEDURE — 97110 THERAPEUTIC EXERCISES: CPT

## 2020-02-27 PROCEDURE — 97140 MANUAL THERAPY 1/> REGIONS: CPT

## 2020-02-27 NOTE — PROGRESS NOTES
PHYSICAL THERAPY - DAILY TREATMENT NOTE    Patient Name: Cristina Walter        Date: 2020  : 1975   yes Patient  Verified  Visit #:     Insurance: Payor: Yuri Schwarz / Plan: [employer] HMO / Product Type: HMO /      In time: 7:33 Out time: 7:58   Total Treatment Time: 25     Medicare/[facility] Time Tracking (below)   Total Timed Codes (min):  na 1:1 Treatment Time:  na     TREATMENT AREA =  Low back pain [M54.5]  SUBJECTIVE  Pain Level (on 0 to 10 scale):  3  / 10   Medication Changes/New allergies or changes in medical history, any new surgeries or procedures?    no  If yes, update Summary List   Subjective Functional Status/Changes:  []  No changes reported     I played volleyball and it wasn't too bad          OBJECTIVE    8 min Therapeutic Exercise:  [x]  See flow sheet   Rationale:      increase ROM, increase strength and improve coordination to improve the patients ability to perform ADLs     22 min Manual Therapy: Shot gun tech, DTM R piri, HS, R IS post inn lyn, L4-5 L FRS lyn, L on L SI lyn   Rationale:      decrease pain, increase ROM and increase tissue extensibility to improve patient's ability to perform ADLs       Billed With/As:   [] TE   [] TA   [] Neuro   [] Self Care Patient Education: [x] Review HEP    [] Progressed/Changed HEP based on:   [] positioning   [] body mechanics   [] transfers   [] heat/ice application    [] other:      Other Objective/Functional Measures:    Cont to have sig increase in soft tissue tension noted at R para     Post Treatment Pain Level (on 0 to 10) scale:   1  / 10     ASSESSMENT  Assessment/Changes in Function:     Decreased pain wth amb after man Rx     []  See Progress Note/Recertification   Patient will continue to benefit from skilled PT services to modify and progress therapeutic interventions, address functional mobility deficits and address ROM deficits to attain remaining goals.    Progress toward goals / Updated goals:    Slowly progressing with pain reduction      PLAN  [x]  Upgrade activities as tolerated yes Continue plan of care   []  Discharge due to :    []  Other:      Therapist: Esther Gonzalez PT    Date: 2/27/2020 Time: 6:03 AM     Future Appointments   Date Time Provider Dangelo Bhardwaj   2/27/2020  7:30 AM Arelt Mayo, PT [facility]   3/4/2020  6:30 AM Arlet Mayo, PT [facility]

## 2020-03-04 ENCOUNTER — HOSPITAL ENCOUNTER (OUTPATIENT)
Dept: PHYSICAL THERAPY | Age: 45
Discharge: HOME | End: 2020-03-04
Payer: COMMERCIAL

## 2020-03-04 PROCEDURE — 97140 MANUAL THERAPY 1/> REGIONS: CPT

## 2020-03-04 PROCEDURE — 97110 THERAPEUTIC EXERCISES: CPT

## 2020-03-04 NOTE — PROGRESS NOTES
PHYSICAL THERAPY - DAILY TREATMENT NOTE    Patient Name: Jennifer Tipton        Date: 3/4/2020  : 1975   yes Patient  Verified  Visit #:     Insurance: Payor: Tyler Ibrahim / Plan: [employer] HMO / Product Type: HMO /      In time: 6:30 Out time: 7:10   Total Treatment Time: 40     Medicare/[facility] Time Tracking (below)   Total Timed Codes (min):  na 1:1 Treatment Time:  na     TREATMENT AREA =  Low back pain [M54.5]  SUBJECTIVE  Pain Level (on 0 to 10 scale):  0  / 10   Medication Changes/New allergies or changes in medical history, any new surgeries or procedures?    no  If yes, update Summary List   Subjective Functional Status/Changes:  []  No changes reported     Its been feeling pretty good. I worked out and had no problem          OBJECTIVE  20 min Therapeutic Exercise:  [x]? 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, DTM R RF, piri, HS, R IS ant inn lyn   Rationale:      decrease pain, increase ROM and increase tissue extensibility to improve patient's ability to perform ADLs         Billed With/As:   [] TE   [] TA   [] Neuro   [] Self Care Patient Education: [x] Review HEP    [] Progressed/Changed HEP based on:   [] positioning   [] body mechanics   [] transfers   [] heat/ice application    [] other:      Other Objective/Functional Measures:  Demonstrate decreased stability with R step downs     Post Treatment Pain Level (on 0 to 10) scale:   0  / 10     ASSESSMENT  Assessment/Changes in Function:     Improved stability after man Rx with step downs     []  See Progress Note/Recertification   Patient will continue to benefit from skilled PT services to modify and progress therapeutic interventions, address functional mobility deficits and address ROM deficits to attain remaining goals.    Progress toward goals / Updated goals:    Progressing slowly with pain reduction      PLAN  [x]  Upgrade activities as tolerated yes Continue plan of care   []  Discharge due to :    []  Other:      Therapist: Charmaine Sawant PT    Date: 3/4/2020 Time: 6:26 AM     Future Appointments   Date Time Provider Dangelo Bhardwaj   3/4/2020  6:30 AM Margaret Jain PT [facility]

## 2020-03-31 NOTE — PROGRESS NOTES
[facility] PHYSICAL THERAPY  [address] [place] [address], [address],[facility], [address] [facility] - Phone: [phone]  Fax: [phone]  PROGRESS NOTE  Patient Name: Cristina Walter : 1975   Treatment/Medical Diagnosis: Low back pain [M54.5]   Referral Source: Lynne Santamaria MD       Date of Initial Visit: 20 Attended Visits: 13 Missed Visits: 0      SUMMARY OF TREATMENT  Cristina Walter has been seen at our clinic 1-2x/wk for a total of 13 visits. Pt treatment has consisted of  therapeutic exercise for thoracic ROM, hip/core strengthening, and manual therapy(jt mobilization and deep tissue mobilization)    CURRENT STATUS  Pt temporarily placed on hold due to the nationwide concerns over COVID-19. Pt issued HEP and therapy staff will continue to contact pt every 1-2 weeks via phone to monitor progress. Plan to resume physical therapy after the resolution of COVID 19 situation. If you have any questions/comments please contact us directly at [phone]. Thank you for allowing us to assist in the care of your patient. Therapist Signature: Eleni Ramirez, DPT, OCS, SCS, CSCS Date: 3/31/2020     Time: 11:01 AM   NOTE TO PHYSICIAN:  PLEASE COMPLETE THE ORDERS BELOW AND FAX TO   [facility]: [phone]  If you are unable to process this request in 24 hours please contact our office: [phone]    ___ I have read the above report and request that my patient continue as recommended.   ___ I have read the above report and request that my patient continue therapy with the following changes/special instructions:_________________________________________________________   ___ I have read the above report and request that my patient be discharged from therapy.      Physician Signature:        Date:       Time:

## 2020-06-02 ENCOUNTER — APPOINTMENT (OUTPATIENT)
Dept: PHYSICAL THERAPY | Age: 45
End: 2020-06-02

## 2020-06-08 ENCOUNTER — HOSPITAL ENCOUNTER (OUTPATIENT)
Dept: PHYSICAL THERAPY | Age: 45
Discharge: HOME | End: 2020-06-08
Payer: COMMERCIAL

## 2020-06-08 PROCEDURE — 97161 PT EVAL LOW COMPLEX 20 MIN: CPT

## 2020-06-08 PROCEDURE — 97140 MANUAL THERAPY 1/> REGIONS: CPT

## 2020-06-08 NOTE — PROGRESS NOTES
PHYSICAL THERAPY - DAILY TREATMENT NOTE    Patient Name: Maria Dolores Tipton        Date: 2020  : 1975   YES Patient  Verified  Visit #:      12  Insurance: Payor: Zach Nguyen / Plan: Cookie Burt HMO / Product Type: HMO /      In time: 3:05 Out time: 3:45   Total Treatment Time: 40     Medicare Time Tracking (below)   Total Timed Codes (min):  na 1:1 Treatment Time:  na     TREATMENT AREA =  Right hip pain [M25.551]    SUBJECTIVE  Pain Level (on 0 to 10 scale):    Medication Changes/New allergies or changes in medical history, any new surgeries or procedures? NO    If yes, update Summary List   Subjective Functional Status/Changes:  []  No changes reported     SEE IE          OBJECTIVE    15 min Manual Therapy: DTM R RF, Add, L Gm, shot gun tech, L4-5 L FRS lny, L on L SI lyn   Rationale:      decrease pain, increase ROM and increase tissue extensibility to improve patient's ability to perform ADLs    5 min Self Care: THERESA hip shift   Rationale:    increase ROM, increase strength and improve coordination to improve the patients ability to perform ADLs      Billed With/As:   [] TE   [] TA   [] Neuro   [x] Self Care Patient Education: [x] Review HEP    [] Progressed/Changed HEP based on:   [] positioning   [] body mechanics   [] transfers   [] heat/ice application    [] other:       min Patient Education:  YES  Reviewed HEP   []  Progressed/Changed HEP based on:         Other Objective/Functional Measures:    SEE IE     Post Treatment Pain Level (on 0 to 10) scale:       ASSESSMENT  Assessment/Changes in Function:     SEE IE     []  See Progress Note/Recertification   Patient will continue to benefit from skilled PT services to modify and progress therapeutic interventions, address functional mobility deficits, address ROM deficits, address strength deficits, analyze and address soft tissue restrictions, analyze and cue movement patterns, analyze and modify body mechanics/ergonomics and assess and modify postural abnormalities to attain remaining goals.    Progress toward goals / Updated goals:         PLAN  []  Upgrade activities as tolerated YES Continue plan of care   []  Discharge due to :    []  Other:      Therapist: Ernestina Pérez PT, OCS, SCS, CSCS    Date: 6/8/2020 Time: 4:52 PM       Future Appointments   Date Time Provider Dangelo Bhardwaj   6/23/2020  2:30 PM GARY [facility] [address]   6/29/2020  8:00 AM GARY Cook [address]

## 2020-06-08 NOTE — PROGRESS NOTES
[facility] PHYSICAL THERAPY  [address] [place] [address], [facility] 201,[facility], [facility] - Phone: [phone]  Fax: [phone] / [phone] [facility]  Patient Name: Annia Mendoza : 1975   Medical   Diagnosis: Right hip pain [M25.551] Treatment Diagnosis: Right hip pain [M25.551]   Onset Date: chronic     Referral Source: Omayra Castillo DO Start of Care [facility]): 2020   Prior Hospitalization: See medical history Provider #: [id number]   Prior Level of Function: Pain free exercising. Comorbidities: none   Medications: Verified on Patient Summary List   The Plan of Care and following information is based on the information from the initial evaluation.   ===========================================================================================  Assessment / bermudez information:  Annia Mendoza is a 40 y.o.  yo female with Dx of Right hip pain [M25.551]. She currently rates her pain as 6/10 at worst, 0/10 at best, primarily located at lower lumbar region as well as lateral aspect of L hip and anterior aspect of her R hip. She complains of difficulty and increase pain with prolonged sitting and squatting. Objective Findings:  Lumbar ROM: Flx  = limited by 20% Ext =WNL, Rot: R = limited by 10%, L = limited by 40%. Manual Muscle Testing:  R hip abd and R ankle IV are REduced. SI/Lumbar Screening: L iliosacral anteriort innominate, L4-5 LFRS, L on L SI innominate noted.   Pt instructed in HEP and will f/u in clinic for PT.  ===========================================================================================  Eval Complexity: History MEDIUM  Complexity : 1-2 comorbidities / personal factors will impact the outcome/ POC ;  Examination  MEDIUM Complexity : 3 Standardized tests and measures addressing body structure, function, activity limitation and / or participation in recreation ; Presentation MEDIUM Complexity : Evolving with changing characteristics ; Decision Making MEDIUM Complexity : FOTO score of 26-74; Overall Complexity MEDIUM  Problem List: pain affecting function, decrease ROM, decrease strength, decrease ADL/ functional abilitiies, decrease activity tolerance and decrease flexibility/ joint mobility   Treatment Plan may include any combination of the following: Therapeutic exercise, Therapeutic activities, Neuromuscular re-education, Physical agent/modality, Manual therapy, Patient education, Self Care training and Functional mobility training  Patient / Family readiness to learn indicated by: asking questions  Persons(s) to be included in education: patient (P)  Barriers to Learning/Limitations: None  Measures taken, if barriers to learning:    Patient Goal (s): Decrease pain    Patient self reported health status: good  Rehabilitation Potential: good     Short Term Goals: To be accomplished in  1-2  weeks:  1. Independent with HEP. 2. Decrease max pain 25-50% to assist with ADLs   Long Term Goals: To be accomplished in  3-4  weeks:  1. Decrease max pain 50-75% to assist with ADls  2. Increase FOTO score to 65% to show functional improvment. 3.  Will rate  >/= +5 on Global Rating of Change and be prepared to DC to HEP. Frequency / Duration:   Patient to be seen  2-3  times per week for 3-4  weeks:  Patient / Caregiver education and instruction: self care and exercises    Therapist Signature: Norm Berg, DPT, OCS, SCS, CSCS Date: 3/5/5007   Certification Period: na Time: 4:54 PM   ===========================================================================================  I certify that the above Physical Therapy Services are being furnished while the patient is under my care. I agree with the treatment plan and certify that this therapy is necessary.     Physician Signature:        Date:       Time:     Please sign and return to [facility] or you may fax the signed copy to [phone]. Thank you.

## 2020-06-09 ENCOUNTER — HOSPITAL ENCOUNTER (OUTPATIENT)
Dept: PHYSICAL THERAPY | Age: 45
Discharge: HOME | End: 2020-06-09
Payer: COMMERCIAL

## 2020-06-09 PROCEDURE — 97110 THERAPEUTIC EXERCISES: CPT

## 2020-06-09 PROCEDURE — 97140 MANUAL THERAPY 1/> REGIONS: CPT

## 2020-06-09 NOTE — PROGRESS NOTES
PHYSICAL THERAPY - DAILY TREATMENT NOTE    Patient Name: Reid Libman        Date: 2020  : 1975   yes Patient  Verified  Visit #:      of   12  Insurance: Payor: [facility] / Plan: [address] [place] [address] [place] HMO / Product Type: HMO /      In time: 4:00 Out time: 4:35   Total Treatment Time: 35     Medicare/[facility] Time Tracking (below)   Total Timed Codes (min):  na 1:1 Treatment Time:  na     TREATMENT AREA =  Right hip pain [M25.551]  SUBJECTIVE  Pain Level (on 0 to 10 scale):  5  / 10   Medication Changes/New allergies or changes in medical history, any new surgeries or procedures?    no  If yes, update Summary List   Subjective Functional Status/Changes:  []  No changes reported     I lifted a pot of flowers last night and its been hurting. OBJECTIVE    15 min Therapeutic Exercise:  [x]  See flow sheet   Rationale:      increase ROM, increase strength and improve coordination to improve the patients ability to perform ADLs     20 min Manual Therapy: DTM R add, L piri, para, shot gun tech, L IS ant inn lyn, L on L SI lyn, L2 L FRS lyn   Rationale:      decrease pain, increase ROM and increase tissue extensibility to improve patient's ability to perform ADLs      Billed With/As:   [] TE   [] TA   [] Neuro   [] Self Care Patient Education: [x] Review HEP    [] Progressed/Changed HEP based on:   [] positioning   [] body mechanics   [] transfers   [] heat/ice application    [] other:      Other Objective/Functional Measures:    Sig increase in soft tissue tension noted at R para     Post Treatment Pain Level (on 0 to 10) scale:    10     ASSESSMENT  Assessment/Changes in Function:     Sig difficulty flx lower lumbar segment     []  See Progress Note/Recertification   Patient will continue to benefit from skilled PT services to modify and progress therapeutic interventions, address functional mobility deficits and address ROM deficits to attain remaining goals.    Progress toward goals / Updated goals:    Independent with HEP     PLAN  [x]  Upgrade activities as tolerated yes Continue plan of care   []  Discharge due to :    []  Other:      Therapist: Juan Pablo Avery PT    Date: 6/9/2020 Time: 4:46 PM     Future Appointments   Date Time Provider Dangelo Bhardwaj   6/23/2020  2:30 PM [facility] [address]   6/29/2020  8:00 AM GARY [facility] [address]

## 2020-06-19 ENCOUNTER — HOSPITAL ENCOUNTER (OUTPATIENT)
Dept: PHYSICAL THERAPY | Age: 45
Discharge: HOME | End: 2020-06-19
Payer: COMMERCIAL

## 2020-06-19 PROCEDURE — 97110 THERAPEUTIC EXERCISES: CPT

## 2020-06-19 PROCEDURE — 97140 MANUAL THERAPY 1/> REGIONS: CPT

## 2020-06-19 NOTE — PROGRESS NOTES
PHYSICAL THERAPY - DAILY TREATMENT NOTE    Patient Name: Savage Phoenix        Date: 2020  : 1975   yes Patient  Verified  Visit #:   3   of   12  Insurance: Payor: Je Glez / Plan: [place] HMO / Product Type: HMO /      In time: 8:10 Out time: 8:45   Total Treatment Time: 35     Medicare/[facility] Time Tracking (below)   Total Timed Codes (min):  na 1:1 Treatment Time:  na     TREATMENT AREA =  Right hip pain [M25.551]  SUBJECTIVE  Pain Level (on 0 to 10 scale):  4  / 10   Medication Changes/New allergies or changes in medical history, any new surgeries or procedures?    no  If yes, update Summary List   Subjective Functional Status/Changes:  []  No changes reported     It was better, but started to feel it a little yesterday. My R hip feel very tight          OBJECTIVE  15 min Therapeutic Exercise:  [x]? See flow sheet   Rationale:      increase ROM, increase strength and improve coordination to improve the patients ability to perform ADLs      20 min Manual Therapy: DTM R TFL, ant Gm, shot gun tech,R hip mob, L4-5 L FRS lyn   Rationale:      decrease pain, increase ROM and increase tissue extensibility to improve patient's ability to perform ADLs         Billed With/As:   [] TE   [] TA   [] Neuro   [] Self Care Patient Education: [x] Review HEP    [] Progressed/Changed HEP based on:   [] positioning   [] body mechanics   [] transfers   [] heat/ice application    [] other:      Other Objective/Functional Measures:    Sig limited ER on R     Post Treatment Pain Level (on 0 to 10) scale:   3  / 10     ASSESSMENT  Assessment/Changes in Function:     Decreased pain with walking after man Rx     []  See Progress Note/Recertification   Patient will continue to benefit from skilled PT services to modify and progress therapeutic interventions, address functional mobility deficits and address ROM deficits to attain remaining goals.    Progress toward goals / Updated goals:    Progressing slowly with pain reduction     PLAN  [x]  Upgrade activities as tolerated yes Continue plan of care   []  Discharge due to :    []  Other:      Therapist: Joao Roberts PT    Date: 6/19/2020 Time: 7:45 AM     Future Appointments   Date Time Provider Dangelo Bhardwaj   6/19/2020  8:00 AM Santiago Montalvo PT [facility]   6/23/2020  2:30 PM Santiago Montalvo PT [facility]   6/29/2020  8:00 AM Santiago Montalvo, PT [address]

## 2020-06-23 ENCOUNTER — HOSPITAL ENCOUNTER (OUTPATIENT)
Dept: PHYSICAL THERAPY | Age: 45
Discharge: HOME | End: 2020-06-23
Payer: COMMERCIAL

## 2020-06-23 PROCEDURE — 97140 MANUAL THERAPY 1/> REGIONS: CPT

## 2020-06-23 PROCEDURE — 97110 THERAPEUTIC EXERCISES: CPT

## 2020-06-23 NOTE — PROGRESS NOTES
PHYSICAL THERAPY - DAILY TREATMENT NOTE    Patient Name: Bessie Lorenz        Date: 2020  : 1975   yes Patient  Verified  Visit #:      12  Insurance: Payor: Cookie De Jesus / Plan: [address] [place] [address] [place] HMO / Product Type: HMO /      In time: 2:30 Out time: 3:15   Total Treatment Time: 45     Medicare/[facility] Time Tracking (below)   Total Timed Codes (min):  na 1:1 Treatment Time:  na     TREATMENT AREA =  Right hip pain [M25.551]  SUBJECTIVE  Pain Level (on 0 to 10 scale):  3  / 10   Medication Changes/New allergies or changes in medical history, any new surgeries or procedures? yes  If yes, update Summary List   Subjective Functional Status/Changes:  []  No changes reported     I felt pretty good after the last visit until yesterday. I still get the groin pain on the R and adductors are really tight. OBJECTIVE  15 min Therapeutic Exercise:  [x]? ?  See flow sheet   Rationale:      increase ROM, increase strength and improve coordination to improve the patients ability to perform ADLs      30 min Manual Therapy: DTM R TFL, adductors, ant Gm, shot gun tech,R hip mob, L4-5 L FRS lyn   Rationale:      decrease pain, increase ROM and increase tissue extensibility to improve patient's ability to perform ADLs     Billed With/As:   [] TE   [] TA   [] Neuro   [] Self Care Patient Education: [x] Review HEP    [] Progressed/Changed HEP based on:   [] positioning   [] body mechanics   [] transfers   [] heat/ice application    [] other:      Other Objective/Functional Measures:    Cont to be limited in R ER with bony end feel      Post Treatment Pain Level (on 0 to 10) scale:   1  / 10     ASSESSMENT  Assessment/Changes in Function:     Improved ER ROM noted after man Rx     []  See Progress Note/Recertification   Patient will continue to benefit from skilled PT services to modify and progress therapeutic interventions, address functional mobility deficits and address ROM deficits to attain remaining goals. Progress toward goals / Updated goals:    Slow progress with pain reduction      PLAN  [x]  Upgrade activities as tolerated yes Continue plan of care   []  Discharge due to :    []  Other:      Therapist: Marilu Brito PT    Date: 6/23/2020 Time: 7:50 AM     Future Appointments   Date Time Provider Dangelo Bhardwaj   6/23/2020  2:30 PM Deidre Garcia, PT [place] [address]   6/29/2020  8:00 AM Deidre Garcia,  [address] [facility]   7/2/2020  8:45 AM Deidre Garcia, PT [place] [address]   7/6/2020  8:00 AM Deidre Garcia, PT [place] [address]   7/9/2020  8:00 AM Deidre Garcia, PT [place] [address]   7/13/2020  8:00 AM Deidre Garcia,  [address] [facility]   7/15/2020 10:15 AM Deidre Garcia, PT [address] [address]

## 2020-06-29 ENCOUNTER — HOSPITAL ENCOUNTER (OUTPATIENT)
Dept: PHYSICAL THERAPY | Age: 45
Discharge: HOME | End: 2020-06-29
Payer: COMMERCIAL

## 2020-06-29 PROCEDURE — 97140 MANUAL THERAPY 1/> REGIONS: CPT

## 2020-06-29 NOTE — PROGRESS NOTES
PHYSICAL THERAPY - DAILY TREATMENT NOTE    Patient Name: Anneliese Chavez        Date: 2020  : 1975   yes Patient  Verified  Visit #:      of   12  Insurance: Payor: Felipe Florian / Plan: [employer] HMO / Product Type: HMO /      In time: 8:15 Out time: 8:45   Total Treatment Time: 30     Medicare/[facility] Time Tracking (below)   Total Timed Codes (min):  na 1:1 Treatment Time:  na     TREATMENT AREA =  Right hip pain [M25.551]  SUBJECTIVE  Pain Level (on 0 to 10 scale):  2  / 10   Medication Changes/New allergies or changes in medical history, any new surgeries or procedures?    no  If yes, update Summary List   Subjective Functional Status/Changes:  []  No changes reported     Not hurting  Much today, but I havent done much of a workout. I ran to get out of rain, but it didn't hurt. OBJECTIVE  5 min Therapeutic Exercise:  [x]? ??  See flow sheet   Rationale:      increase ROM, increase strength and improve coordination to improve the patients ability to perform ADLs      25 min Manual Therapy: DTM R TFL, adductors, ant Gm,,R hip mob,   Rationale:      decrease pain, increase ROM and increase tissue extensibility to improve patient's ability to perform ADLs      Billed With/As:   [] TE   [] TA   [] Neuro   [] Self Care Patient Education: [x] Review HEP    [] Progressed/Changed HEP based on:   [] positioning   [] body mechanics   [] transfers   [] heat/ice application    [] other:      Other Objective/Functional Measures:    Cont to have sig limited R Hip ER with bony end feel      Post Treatment Pain Level (on 0 to 10) scale:   1  / 10     ASSESSMENT  Assessment/Changes in Function:     No sig change in ER ROM   Discussed f/u with MD for further testing/Dx     []  See Progress Note/Recertification   Patient will continue to benefit from skilled PT services to modify and progress therapeutic interventions, address functional mobility deficits and address ROM deficits to attain remaining goals. Progress toward goals / Updated goals:    No sig change towards LTGs     PLAN  [x]  Upgrade activities as tolerated yes Continue plan of care   []  Discharge due to :    []  Other:      Therapist: Jennifer Robledo, PT    Date: 6/29/2020 Time: 7:40 AM     Future Appointments   Date Time Provider Dangelo Bhardwaj   6/29/2020  8:00 AM Shannon Mcdowell, PT [facility]   7/2/2020  8:45 AM Shannon Mcdowell, PT [place]   7/6/2020  8:00 AM Shannon Mcdowell, PT [place] [address]   7/9/2020  8:00 AM Shannon Mcdowell, PT [place] [address]   7/13/2020  8:00 AM Shannon Mcdowell, PT [facility]   7/15/2020 10:15 AM Shannon Mcdowell, PT [address]

## 2020-07-02 ENCOUNTER — HOSPITAL ENCOUNTER (OUTPATIENT)
Dept: PHYSICAL THERAPY | Age: 45
Discharge: HOME | End: 2020-07-02
Payer: COMMERCIAL

## 2020-07-02 PROCEDURE — 97140 MANUAL THERAPY 1/> REGIONS: CPT

## 2020-07-02 NOTE — PROGRESS NOTES
PHYSICAL THERAPY - DAILY TREATMENT NOTE    Patient Name: Dorian Dacosta        Date: 2020  : 1975   yes Patient  Verified  Visit #:     Insurance: Payor: Diane Ramirez / Plan: [address] [place], [place] [address] HMO / Product Type: HMO /      In time: 8:50 Out time: 9:20   Total Treatment Time: 30     Medicare/[facility] Time Tracking (below)   Total Timed Codes (min):  na 1:1 Treatment Time:  na     TREATMENT AREA =  Right hip pain [M25.551]  SUBJECTIVE  Pain Level (on 0 to 10 scale):  2  / 10   Medication Changes/New allergies or changes in medical history, any new surgeries or procedures?    no  If yes, update Summary List   Subjective Functional Status/Changes:  []  No changes reported     Jordyn only walked this week. Taking a little break from working out so its not bad          OBJECTIVE    5 min Therapeutic Exercise:  [x]? ???  See flow sheet   Rationale:      increase ROM, increase strength and improve coordination to improve the patients ability to perform ADLs      25 min Manual Therapy: DTM R TFL, adductors, ant Gm,,R hip mob, RIS post inn lyn   Rationale:      decrease pain, increase ROM and increase tissue extensibility to improve patient's ability to perform ADLs       Billed With/As:   [] TE   [] TA   [] Neuro   [] Self Care Patient Education: [x] Review HEP    [] Progressed/Changed HEP based on:   [] positioning   [] body mechanics   [] transfers   [] heat/ice application    [] other:      Other Objective/Functional Measures:    C/O tingling of R ankle during Hip mob (~90/90 position) indicating possible piriformis syndrome.    Cont to have bony end feel in ER with sig limitation in ROM in ER     Post Treatment Pain Level (on 0 to 10) scale:   1  / 10     ASSESSMENT  Assessment/Changes in Function:     Cont to have bony end feel in ER with sig limitation in ROM in ER     []  See Progress Note/Recertification   Patient will continue to benefit from skilled PT services to modify and progress therapeutic interventions, address functional mobility deficits and address ROM deficits to attain remaining goals.    Progress toward goals / Updated goals:    No sig change towards LTGs today     PLAN  [x]  Upgrade activities as tolerated yes Continue plan of care   []  Discharge due to :    []  Other:      Therapist: Jesenia Reed PT    Date: 7/2/2020 Time: 7:41 AM     Future Appointments   Date Time Provider Dangelo Bhardwaj   7/2/2020  8:45 AM Prema Slider, PT [facility]   7/6/2020  8:00 AM Prema Slider, PT [place] [address]   7/9/2020  8:00 AM Prema Slider, PT [place] [address]   7/13/2020  8:00 AM Prema Slider, PT [facility]   7/15/2020 10:15 AM Prema Slider, PT [place] [address]

## 2020-07-06 ENCOUNTER — APPOINTMENT (OUTPATIENT)
Dept: PHYSICAL THERAPY | Age: 45
End: 2020-07-06
Payer: COMMERCIAL

## 2020-07-09 ENCOUNTER — APPOINTMENT (OUTPATIENT)
Dept: PHYSICAL THERAPY | Age: 45
End: 2020-07-09
Payer: COMMERCIAL

## 2020-07-09 NOTE — PROGRESS NOTES
[facility] PHYSICAL THERAPY  [address] [place] [address] [address],[place], [address] [facility] - Phone: [phone]  Fax: [phone]  DISCHARGE SUMMARY  Patient Name: Miguel Bolanos : 1975   Treatment/Medical Diagnosis: Low back pain [M54.5]   Referral Source: Luis Garcia MD       Date of Initial Visit: 20 Attended Visits: 13 Missed Visits: 0      SUMMARY OF TREATMENT  Miguel Bolanos has been seen at our clinic 1-2x/wk for a total of 13visits. Pt treatment has consisted of  therapeutic exercise for thoracic ROM, hip/core strengthening, and manual therapy(jt mobilization and deep tissue mobilization)  CURRENT STATUS    Pt has had a good tolerance to physical therapy treatment. Pt was put on hold for PT due to COVID 19 situation and did not return to PT after the clinic opened back up. Goal/Measure of Progress Goal Met? 1. Decrease Max pain by 50-75% to assist with ADLs   Status at last Eval: 6/10 Current Status: /10 progressing   2. Increase FOTO score to 675 % show functional improvement   Status at last Eval: 61% Current Status: 57% no   3. Will rate >/= +5 on Global Rating of Change and be prepared to DC to HEP. Status at last Eval: na  na  no     RECOMMENDATIONS  Discharge from physical therapy treatment with HEP. Specifics:   If you have any questions/comments please contact us directly at [phone]. Thank you for allowing us to assist in the care of your patient.     Therapist Signature: Janneth Villalobos, KENYA, OCS, SCS, CSCS Date: 2020     Time: 9:02 AM

## 2020-07-13 ENCOUNTER — APPOINTMENT (OUTPATIENT)
Dept: PHYSICAL THERAPY | Age: 45
End: 2020-07-13
Payer: COMMERCIAL

## 2020-07-15 ENCOUNTER — HOSPITAL ENCOUNTER (OUTPATIENT)
Dept: PHYSICAL THERAPY | Age: 45
Discharge: HOME | End: 2020-07-15
Payer: COMMERCIAL

## 2020-07-15 PROCEDURE — 97140 MANUAL THERAPY 1/> REGIONS: CPT

## 2020-07-16 ENCOUNTER — HOSPITAL ENCOUNTER (OUTPATIENT)
Dept: PHYSICAL THERAPY | Age: 45
Discharge: HOME | End: 2020-07-16
Payer: COMMERCIAL

## 2020-07-16 PROCEDURE — 97140 MANUAL THERAPY 1/> REGIONS: CPT

## 2020-07-16 NOTE — PROGRESS NOTES
PHYSICAL THERAPY - DAILY TREATMENT NOTE    Patient Name: Wandy Mar        Date: 2020  : 1975   yes Patient  Verified  Visit #:     Insurance: Payor: Kendrick Barrett / Plan: [address] [place] [address] [place] HMO / Product Type: HMO /      In time: 1:05 Out time: 1:35   Total Treatment Time: 30     Medicare/[facility] Time Tracking (below)   Total Timed Codes (min):  na 1:1 Treatment Time:  na     TREATMENT AREA =  Right hip pain [M25.551]  SUBJECTIVE  Pain Level (on 0 to 10 scale):  1  / 10   Medication Changes/New allergies or changes in medical history, any new surgeries or procedures?    no  If yes, update Summary List   Subjective Functional Status/Changes:  []  No changes reported     I got a strange pain after the last visit. My outside calf is tingling, but nothing in the thigh. OBJECTIVE  5 min Therapeutic Exercise:  [x]??????  See flow sheet   Rationale:      increase ROM, increase strength and improve coordination to improve the patients ability to perform ADLs      25 min Manual Therapy: DTM R med HS, adductors, ant Gm,,R hip mob, DTM peroneus   Rationale:      decrease pain, increase ROM and increase tissue extensibility to improve patient's ability to perform ADLs         Billed With/As:   [] TE   [] TA   [] Neuro   [] Self Care Patient Education: [x] Review HEP    [] Progressed/Changed HEP based on:   [] positioning   [] body mechanics   [] transfers   [] heat/ice application    [] other:      Other Objective/Functional Measures:    RFIS did not reproduce the calf symptom  Decreased numbness after man Rx   Post Treatment Pain Level (on 0 to 10) scale:   0  / 10     ASSESSMENT  Assessment/Changes in Function:     Mike Barbour to walk without increase in symptom      []  See Progress Note/Recertification   Patient will continue to benefit from skilled PT services to modify and progress therapeutic interventions, address functional mobility deficits and address ROM deficits to attain remaining goals. Progress toward goals / Updated goals:    Slow progress with ROM     PLAN  [x]  Upgrade activities as tolerated yes Continue plan of care   []  Discharge due to :    []  Other:      Therapist: Stefanie Luong PT    Date: 7/16/2020 Time: 11:48 AM     Future Appointments   Date Time Provider Dangelo Bhardwaj   7/16/2020  1:00 PM Cheryl Douglas, PT [facility]   8/5/2020  7:00 AM Cheryl Douglas, [facility] [address]   8/12/2020  7:00 AM Cheryl Douglas, [facility] [address]

## 2020-08-05 ENCOUNTER — HOSPITAL ENCOUNTER (OUTPATIENT)
Dept: PHYSICAL THERAPY | Age: 45
Discharge: HOME | End: 2020-08-05
Payer: COMMERCIAL

## 2020-08-05 PROCEDURE — 97140 MANUAL THERAPY 1/> REGIONS: CPT

## 2020-08-05 NOTE — PROGRESS NOTES
[facility] PHYSICAL THERAPY  [address] [facility] [address], [address],[facility], [address] - Phone: [phone]  Fax: [phone]  PROGRESS NOTE  Patient Name: Wandy Mar : 1975   Treatment/Medical Diagnosis: Right hip pain [M25.551]   Referral Source: Dot Mendieta DO     Date of Initial Visit: 20 Attended Visits: 9 Missed Visits: 0     SUMMARY OF TREATMENT  Wandy Mar has been seen at our clinic 1-2x/wk for a total of 9 visits. Pt treatment has consisted of  therapeutic exercise for thoracic ROM, hip/core strengthening, and manual therapy(jt mobilization and deep tissue mobilization)  CURRENT STATUS  Pt has had a good tolerance to physical therapy treatment. Although it has improved, she continues to present with decreased R hip ER ROM with bony end feel. She has modified her exercises as advised and reports significantly decreased level of pain. Goal/Measure of Progress Goal Met? 1. Decrease Max pain by 50-75% to assist with ADLs   Status at last Eval: 6/10 Current Status: 2/10 yes   2. Increase FOTO score to 67 % show functional improvement   Status at last Eval: 48% Current Status: 68% yes   3. Will rate >/= +5 on Global Rating of Change and be prepared to DC to HEP. Status at last Eval: na Current Status: +6 yes     New Goals to be achieved in __4__  weeks:  1. Pt will be independent with advanced HEP in preparation for DC   2.     3.     RECOMMENDATIONS    Specifics: 1x/wk x 4 more wks  If you have any questions/comments please contact us directly at [phone]. Thank you for allowing us to assist in the care of your patient.     Therapist Signature: Gonzalo Pinon, DPTRAE, OCS, SCS, CSCS Date: 2020     Time: 12:23 PM   NOTE TO PHYSICIAN:  PLEASE COMPLETE THE ORDERS BELOW AND FAX TO   [facility] Physical Therapy: [phone]  If you are unable to process this request in 24 hours please contact our office: [phone]    ___ I have read the above report and request that my patient continue as recommended.   ___ I have read the above report and request that my patient continue therapy with the following changes/special instructions:_________________________________________________________   ___ I have read the above report and request that my patient be discharged from therapy.      Physician Signature:        Date:       Time:

## 2020-08-05 NOTE — PROGRESS NOTES
PHYSICAL THERAPY - DAILY TREATMENT NOTE    Patient Name: Braulio Flores        Date: 2020  : 1975   yes Patient  Verified  Visit #:     Insurance: Payor: Soraida Gonzalez / Plan: [employer] HMO / Product Type: HMO /      In time: 7:00 Out time: 7:30   Total Treatment Time: 30     Medicare/[facility] Time Tracking (below)   Total Timed Codes (min):  na 1:1 Treatment Time:  na     TREATMENT AREA =  Right hip pain [M25.551]  SUBJECTIVE  Pain Level (on 0 to 10 scale):  1  / 10   Medication Changes/New allergies or changes in medical history, any new surgeries or procedures?    no  If yes, update Summary List   Subjective Functional Status/Changes:  []  No changes reported     I have been modifying my activity and limiting ROM for squats and its feeling better     OBJECTIVE  5 min Therapeutic Exercise:  [x]???????  See flow sheet   Rationale:      increase ROM, increase strength and improve coordination to improve the patients ability to perform ADLs      25 min Manual Therapy: DTM R med HS, adductors, ant Gm,,R hip mob, DTM peroneus   Rationale:      decrease pain, increase ROM and increase tissue extensibility to improve patient's ability to perform ADLs    Billed With/As:   [] TE   [] TA   [] Neuro   [] Self Care Patient Education: [x] Review HEP    [] Progressed/Changed HEP based on:   [] positioning   [] body mechanics   [] transfers   [] heat/ice application    [] other:      Other Objective/Functional Measures:    FOTO = 68  GROC = +6     Post Treatment Pain Level (on 0 to 10) scale:   0  / 10     ASSESSMENT  Assessment/Changes in Function:     Improved hip ER ROM noted after man Rx     []  See Progress Note/Recertification   Patient will continue to benefit from skilled PT services to modify and progress therapeutic interventions, address functional mobility deficits and address ROM deficits to attain remaining goals.    Progress toward goals / Updated goals:    Slowly progressing with function PLAN  [x]  Upgrade activities as tolerated yes Continue plan of care   []  Discharge due to :    []  Other:      Therapist: Hieu Vidal PT    Date: 8/5/2020 Time: 6:43 AM     Future Appointments   Date Time Provider Dangelo Bhardwaj   8/5/2020  7:00 AM Juan C Stark, PT [facility]   8/12/2020  7:00 AM Juan C Stark, PT [id number]

## 2020-08-12 ENCOUNTER — HOSPITAL ENCOUNTER (OUTPATIENT)
Dept: PHYSICAL THERAPY | Age: 45
Discharge: HOME | End: 2020-08-12
Payer: COMMERCIAL

## 2020-08-12 PROCEDURE — 97140 MANUAL THERAPY 1/> REGIONS: CPT

## 2020-08-12 PROCEDURE — 97110 THERAPEUTIC EXERCISES: CPT

## 2020-08-12 NOTE — PROGRESS NOTES
PHYSICAL THERAPY - DAILY TREATMENT NOTE    Patient Name: Jas Jean        Date: 2020  : 1975   yes Patient  Verified  Visit #:   10   of   24  Insurance: Payor: Santy Johns / Plan: [employer] HMO / Product Type: HMO /      In time: 7:00 Out time: 7:40   Total Treatment Time: 40     Medicare/[employer] Time Tracking (below)   Total Timed Codes (min):  na 1:1 Treatment Time:  na     TREATMENT AREA =  Right hip pain [M25.551]  SUBJECTIVE  Pain Level (on 0 to 10 scale):  1  / 10   Medication Changes/New allergies or changes in medical history, any new surgeries or procedures?    no  If yes, update Summary List   Subjective Functional Status/Changes:  []  No changes reported     Its still been pretty good with modification of ex          OBJECTIVE  10 min Therapeutic Exercise:  [x]????????  See flow sheet   Rationale:      increase ROM, increase strength and improve coordination to improve the patients ability to perform ADLs      30 min Manual Therapy: DTM R med HS, Gm, shotgun tech, R IS post inn lyn   Rationale:      decrease pain, increase ROM and increase tissue extensibility to improve patient's ability to perform ADLs      Billed With/As:   [] TE   [] TA   [] Neuro   [] Self Care Patient Education: [x] Review HEP    [] Progressed/Changed HEP based on:   [] positioning   [] body mechanics   [] transfers   [] heat/ice application    [] other:      Other Objective/Functional Measures:    Sig increase in soft tissue tension noted at R HS today      Post Treatment Pain Level (on 0 to 10) scale:   0  / 10     ASSESSMENT  Assessment/Changes in Function:     Slight difficulty maintaining neutral spine with RDL     []  See Progress Note/Recertification   Patient will continue to benefit from skilled PT services to modify and progress therapeutic interventions, address functional mobility deficits and address ROM deficits to attain remaining goals.    Progress toward goals / Updated goals:    Progressing well with pain reduction      PLAN  [x]  Upgrade activities as tolerated yes Continue plan of care   []  Discharge due to :    []  Other:      Therapist: Matias Presley, PT    Date: 8/12/2020 Time: 6:38 AM     Future Appointments   Date Time Provider Dangelo Bhardwaj   8/12/2020  7:00 AM Estefani Hernandez, PT [facility]   8/28/2020  7:00 AM Estefani Hernandez, PT [id number]

## 2020-08-28 ENCOUNTER — HOSPITAL ENCOUNTER (OUTPATIENT)
Dept: PHYSICAL THERAPY | Age: 45
Discharge: HOME | End: 2020-08-28
Payer: COMMERCIAL

## 2020-08-28 PROCEDURE — 97110 THERAPEUTIC EXERCISES: CPT

## 2020-08-28 PROCEDURE — 97140 MANUAL THERAPY 1/> REGIONS: CPT

## 2020-08-28 NOTE — PROGRESS NOTES
PHYSICAL THERAPY - DAILY TREATMENT NOTE    Patient Name: Jacob Nieto        Date: 2020  : 1975   yes Patient  Verified  Visit #:     Insurance: Payor: Blaire Gay / Plan: Elba Sarkar HMO / Product Type: HMO /      In time: 7:05 Out time: 7:45   Total Treatment Time: 40     Medicare/[employer] Time Tracking (below)   Total Timed Codes (min):  na 1:1 Treatment Time:  na     TREATMENT AREA =  Right hip pain [M25.551]  SUBJECTIVE  Pain Level (on 0 to 10 scale):  1  / 10   Medication Changes/New allergies or changes in medical history, any new surgeries or procedures?    no  If yes, update Summary List   Subjective Functional Status/Changes:  []  No changes reported     Its been pretty good. Its just my back started to get sore after a workout.   As long as I modify my workout, the hip has been in minimal pain 1-2/10 at the worst.          OBJECTIVE  10 min Therapeutic Exercise:  [x]?????????  See flow sheet   Rationale:      increase ROM, increase strength and improve coordination to improve the patients ability to perform ADLs      30 min Manual Therapy: DTM R med HS, Gm, shotgun tech, R IS post inn lyn   Rationale:      decrease pain, increase ROM and increase tissue extensibility to improve patient's ability to perform ADLs         Billed With/As:   [] TE   [] TA   [] Neuro   [] Self Care Patient Education: [x] Review HEP    [] Progressed/Changed HEP based on:   [] positioning   [] body mechanics   [] transfers   [] heat/ice application    [] other:      Other Objective/Functional Measures:    Demonstrate hyperlowrdotic posture with overhead activities     Post Treatment Pain Level (on 0 to 10) scale:   0  / 10     ASSESSMENT  Assessment/Changes in Function:     Able to perform overhead activity with a few VCs (ribs down)     []  See Progress Note/Recertification   Patient will continue to benefit from skilled PT services to modify and progress therapeutic interventions, address functional mobility deficits and address ROM deficits to attain remaining goals.    Progress toward goals / Updated goals:    Progressing well with function      PLAN  [x]  Upgrade activities as tolerated yes Continue plan of care   []  Discharge due to :    []  Other:      Therapist: Alberta Wesley, PT    Date: 8/28/2020 Time: 6:48 AM     Future Appointments   Date Time Provider Dangelo Bhardwaj   8/28/2020  7:00 AM Franklin Smart, PT [id number]

## 2020-11-03 NOTE — PROGRESS NOTES
[facility] PHYSICAL THERAPY   [facility] [address],[place], [facility] - Phone: [phone]  Fax: [phone] SUMMARY  Patient Name: Donnell Dejesus : 1975   Treatment/Medical Diagnosis: Right hip pain [M25.551]   Referral Source: Jose L Ring DO       Date of Initial Visit: 20 Attended Visits: 11 Missed Visits: 0      SUMMARY OF TREATMENT  Donnell Dejesus has been seen at our clinic 1-2x/wk for a total of 11 visits. Pt treatment has consisted of  therapeutic exercise for thoracic ROM, hip/core strengthening, and manual therapy(jt mobilization and deep tissue mobilization)    CURRENT STATUS  Pt has had a good tolerance to physical therapy treatment. She has modified her exercises as advised and reports significantly decreased level of pain. She continues to be limited in R hip ER ROM, however, we feel that she will be able to continue to progress with her function independently at this point. Goal/Measure of Progress Goal Met? 1. Pt will be independent with advanced HEP in preparation for DC   Status at last Eval: na Current Status: Independent with all Ex yes     RECOMMENDATIONS  Discharge from physical therapy treatment with HEP. Specifics:   If you have any questions/comments please contact us directly at [phone]. Thank you for allowing us to assist in the care of your patient.     Therapist Signature: Sheldon Atkinson DPT, OCS, SCS, CSCS Date: 11/3/2020     Time: 6:16 PM

## 2024-07-21 NOTE — PROGRESS NOTES
PHYSICAL THERAPY - DAILY TREATMENT NOTE    Patient Name: Rachelle Valladares        Date: 7/15/2020  : 1975   yes Patient  Verified  Visit #:     Insurance: Payor: Dominique Sosa / Plan: [address] [place] [address] [place] HMO / Product Type: HMO /      In time: 10:20 Out time: 11:00   Total Treatment Time: 40     Medicare/[employer] Time Tracking (below)   Total Timed Codes (min):  na 1:1 Treatment Time:  na     TREATMENT AREA =  Right hip pain [M25.551]  SUBJECTIVE  Pain Level (on 0 to 10 scale):  1  / 10   Medication Changes/New allergies or changes in medical history, any new surgeries or procedures?    no  If yes, update Summary List   Subjective Functional Status/Changes:  []  No changes reported     MD didn't find anything. My x ray was normal.         OBJECTIVE  5 min Therapeutic Exercise:  [x]? ????  See flow sheet   Rationale:      increase ROM, increase strength and improve coordination to improve the patients ability to perform ADLs      35 min Manual Therapy: DTM R med HS, adductors, ant Gm,,R hip mob,    Rationale:      decrease pain, increase ROM and increase tissue extensibility to improve patient's ability to perform ADLs         Billed With/As:   [] TE   [] TA   [] Neuro   [] Self Care Patient Education: [x] Review HEP    [] Progressed/Changed HEP based on:   [] positioning   [] body mechanics   [] transfers   [] heat/ice application    [] other:      Other Objective/Functional Measures:    Sig increase in soft tissue tension noted at R adductors and med HS     Post Treatment Pain Level (on 0 to 10) scale:   0  / 10     ASSESSMENT  Assessment/Changes in Function:     Improved ER ROM to ~40 deg after man rx     []  See Progress Note/Recertification   Patient will continue to benefit from skilled PT services to modify and progress therapeutic interventions, address functional mobility deficits and address ROM deficits to attain remaining goals.    Progress toward goals / Updated goals:    Slow progress with ROM PLAN  [x]  Upgrade activities as tolerated yes Continue plan of care   []  Discharge due to :    []  Other:      Therapist: Flavia Lennon, PT    Date: 7/15/2020 Time: 7:52 AM     Future Appointments   Date Time Provider Dangelo Bhardwaj   7/15/2020 10:15 AM GARY Gandhi [id number] EMT / Paramedic